# Patient Record
Sex: MALE | Race: WHITE | NOT HISPANIC OR LATINO | Employment: FULL TIME | URBAN - METROPOLITAN AREA
[De-identification: names, ages, dates, MRNs, and addresses within clinical notes are randomized per-mention and may not be internally consistent; named-entity substitution may affect disease eponyms.]

---

## 2022-09-27 ENCOUNTER — TELEPHONE (OUTPATIENT)
Dept: CARDIOLOGY | Facility: CLINIC | Age: 52
End: 2022-09-27
Payer: COMMERCIAL

## 2022-09-27 NOTE — TELEPHONE ENCOUNTER
----- Message from Marcell Starr sent at 9/27/2022 12:06 PM CDT -----  Regarding: pt called  Name of Who is Calling: REX CABRERA [[id number]]      What is the request in detail: pt called requesting appt for dates 10/4,10/5/10/12 and 10/24 please advise      Can the clinic reply by [facility]: No      What Number to Call Back if not in [facility]:[phone] Deann (wife)

## 2024-08-07 ENCOUNTER — OFFICE VISIT (OUTPATIENT)
Dept: FAMILY MEDICINE | Facility: CLINIC | Age: 54
End: 2024-08-07
Payer: COMMERCIAL

## 2024-08-07 ENCOUNTER — LAB VISIT (OUTPATIENT)
Dept: LAB | Facility: HOSPITAL | Age: 54
End: 2024-08-07
Attending: FAMILY MEDICINE
Payer: COMMERCIAL

## 2024-08-07 VITALS
DIASTOLIC BLOOD PRESSURE: 82 MMHG | WEIGHT: 218.81 LBS | SYSTOLIC BLOOD PRESSURE: 138 MMHG | BODY MASS INDEX: 33.16 KG/M2 | OXYGEN SATURATION: 98 % | HEIGHT: 68 IN | HEART RATE: 91 BPM

## 2024-08-07 DIAGNOSIS — Z23 NEED FOR VACCINATION: ICD-10-CM

## 2024-08-07 DIAGNOSIS — I10 HYPERTENSION, ESSENTIAL: ICD-10-CM

## 2024-08-07 DIAGNOSIS — R07.89 OTHER CHEST PAIN: ICD-10-CM

## 2024-08-07 DIAGNOSIS — Z00.00 ENCOUNTER FOR MEDICAL EXAMINATION TO ESTABLISH CARE: ICD-10-CM

## 2024-08-07 DIAGNOSIS — Z79.899 ENCOUNTER FOR LONG-TERM (CURRENT) USE OF OTHER MEDICATIONS: Primary | ICD-10-CM

## 2024-08-07 DIAGNOSIS — Z11.59 NEED FOR HEPATITIS C SCREENING TEST: ICD-10-CM

## 2024-08-07 DIAGNOSIS — Z12.5 SCREENING PSA (PROSTATE SPECIFIC ANTIGEN): ICD-10-CM

## 2024-08-07 DIAGNOSIS — Z79.899 ENCOUNTER FOR LONG-TERM (CURRENT) USE OF MEDICATIONS: ICD-10-CM

## 2024-08-07 DIAGNOSIS — E78.5 HYPERLIPIDEMIA LDL GOAL <130: ICD-10-CM

## 2024-08-07 DIAGNOSIS — Z11.4 SCREENING FOR HIV (HUMAN IMMUNODEFICIENCY VIRUS): ICD-10-CM

## 2024-08-07 DIAGNOSIS — Z79.899 ENCOUNTER FOR LONG-TERM (CURRENT) USE OF OTHER MEDICATIONS: ICD-10-CM

## 2024-08-07 DIAGNOSIS — Z13.220 LIPID SCREENING: ICD-10-CM

## 2024-08-07 LAB
ALBUMIN SERPL BCP-MCNC: 4.5 G/DL (ref 3.5–5.2)
ALP SERPL-CCNC: 55 U/L (ref 55–135)
ALT SERPL W/O P-5'-P-CCNC: 31 U/L (ref 10–44)
ANION GAP SERPL CALC-SCNC: 12 MMOL/L (ref 8–16)
AST SERPL-CCNC: 26 U/L (ref 10–40)
BILIRUB SERPL-MCNC: 2.4 MG/DL (ref 0.1–1)
BUN SERPL-MCNC: 10 MG/DL (ref 6–20)
CALCIUM SERPL-MCNC: 10 MG/DL (ref 8.7–10.5)
CHLORIDE SERPL-SCNC: 104 MMOL/L (ref 95–110)
CHOLEST SERPL-MCNC: 239 MG/DL (ref 120–199)
CHOLEST/HDLC SERPL: 6.3 {RATIO} (ref 2–5)
CO2 SERPL-SCNC: 20 MMOL/L (ref 23–29)
COMPLEXED PSA SERPL-MCNC: 0.96 NG/ML (ref 0–4)
CREAT SERPL-MCNC: 1 MG/DL (ref 0.5–1.4)
ERYTHROCYTE [DISTWIDTH] IN BLOOD BY AUTOMATED COUNT: 12.8 % (ref 11.5–14.5)
EST. GFR  (NO RACE VARIABLE): >60 ML/MIN/1.73 M^2
ESTIMATED AVG GLUCOSE: 97 MG/DL (ref 68–131)
GLUCOSE SERPL-MCNC: 87 MG/DL (ref 70–110)
HBA1C MFR BLD: 5 % (ref 4–5.6)
HCT VFR BLD AUTO: 46.9 % (ref 40–54)
HCV AB SERPL QL IA: NORMAL
HDLC SERPL-MCNC: 38 MG/DL (ref 40–75)
HDLC SERPL: 15.9 % (ref 20–50)
HGB BLD-MCNC: 16.4 G/DL (ref 14–18)
HIV 1+2 AB+HIV1 P24 AG SERPL QL IA: NORMAL
LDLC SERPL CALC-MCNC: 154.2 MG/DL (ref 63–159)
MCH RBC QN AUTO: 32.9 PG (ref 27–31)
MCHC RBC AUTO-ENTMCNC: 35 G/DL (ref 32–36)
MCV RBC AUTO: 94 FL (ref 82–98)
NONHDLC SERPL-MCNC: 201 MG/DL
PLATELET # BLD AUTO: 219 K/UL (ref 150–450)
PMV BLD AUTO: 10 FL (ref 9.2–12.9)
POTASSIUM SERPL-SCNC: 3.7 MMOL/L (ref 3.5–5.1)
PROT SERPL-MCNC: 7.7 G/DL (ref 6–8.4)
RBC # BLD AUTO: 4.98 M/UL (ref 4.6–6.2)
SODIUM SERPL-SCNC: 136 MMOL/L (ref 136–145)
TRIGL SERPL-MCNC: 234 MG/DL (ref 30–150)
TSH SERPL DL<=0.005 MIU/L-ACNC: 2.76 UIU/ML (ref 0.4–4)
WBC # BLD AUTO: 8.08 K/UL (ref 3.9–12.7)

## 2024-08-07 PROCEDURE — 3079F DIAST BP 80-89 MM HG: CPT | Mod: CPTII,S$GLB,, | Performed by: FAMILY MEDICINE

## 2024-08-07 PROCEDURE — 99386 PREV VISIT NEW AGE 40-64: CPT | Mod: 25,S$GLB,, | Performed by: FAMILY MEDICINE

## 2024-08-07 PROCEDURE — 4010F ACE/ARB THERAPY RXD/TAKEN: CPT | Mod: CPTII,S$GLB,, | Performed by: FAMILY MEDICINE

## 2024-08-07 PROCEDURE — 90471 IMMUNIZATION ADMIN: CPT | Mod: S$GLB,,, | Performed by: FAMILY MEDICINE

## 2024-08-07 PROCEDURE — 83036 HEMOGLOBIN GLYCOSYLATED A1C: CPT | Performed by: FAMILY MEDICINE

## 2024-08-07 PROCEDURE — 3075F SYST BP GE 130 - 139MM HG: CPT | Mod: CPTII,S$GLB,, | Performed by: FAMILY MEDICINE

## 2024-08-07 PROCEDURE — 86803 HEPATITIS C AB TEST: CPT | Performed by: FAMILY MEDICINE

## 2024-08-07 PROCEDURE — 85027 COMPLETE CBC AUTOMATED: CPT | Performed by: FAMILY MEDICINE

## 2024-08-07 PROCEDURE — 99999 PR PBB SHADOW E&M-EST. PATIENT-LVL V: CPT | Mod: PBBFAC,,, | Performed by: FAMILY MEDICINE

## 2024-08-07 PROCEDURE — 1159F MED LIST DOCD IN RCRD: CPT | Mod: CPTII,S$GLB,, | Performed by: FAMILY MEDICINE

## 2024-08-07 PROCEDURE — 90715 TDAP VACCINE 7 YRS/> IM: CPT | Mod: S$GLB,,, | Performed by: FAMILY MEDICINE

## 2024-08-07 PROCEDURE — 87389 HIV-1 AG W/HIV-1&-2 AB AG IA: CPT | Performed by: FAMILY MEDICINE

## 2024-08-07 PROCEDURE — 36415 COLL VENOUS BLD VENIPUNCTURE: CPT | Mod: PO | Performed by: FAMILY MEDICINE

## 2024-08-07 PROCEDURE — 80061 LIPID PANEL: CPT | Performed by: FAMILY MEDICINE

## 2024-08-07 PROCEDURE — 84153 ASSAY OF PSA TOTAL: CPT | Performed by: FAMILY MEDICINE

## 2024-08-07 PROCEDURE — 84443 ASSAY THYROID STIM HORMONE: CPT | Performed by: FAMILY MEDICINE

## 2024-08-07 PROCEDURE — 3008F BODY MASS INDEX DOCD: CPT | Mod: CPTII,S$GLB,, | Performed by: FAMILY MEDICINE

## 2024-08-07 PROCEDURE — 80053 COMPREHEN METABOLIC PANEL: CPT | Performed by: FAMILY MEDICINE

## 2024-08-07 RX ORDER — AMLODIPINE BESYLATE 10 MG/1
10 TABLET ORAL DAILY
Qty: 30 EACH | Refills: 11 | Status: SHIPPED | OUTPATIENT
Start: 2024-08-07 | End: 2025-08-07

## 2024-08-07 RX ORDER — LISINOPRIL 10 MG/1
10 TABLET ORAL DAILY
COMMUNITY
Start: 2005-08-06 | End: 2024-08-07

## 2024-08-07 RX ORDER — LISINOPRIL 10 MG/1
10 TABLET ORAL DAILY
Qty: 90 TABLET | Refills: 4 | Status: SHIPPED | OUTPATIENT
Start: 2024-08-07

## 2024-08-07 RX ORDER — TADALAFIL 20 MG/1
TABLET ORAL
COMMUNITY
Start: 2024-02-24

## 2024-08-08 NOTE — PROGRESS NOTES
Make follow-up lab appointment per recommendation below.  Check to see if patient has seen the results through my chart.  If not then,  #CALL THE PATIENT# to discuss results/see if they have questions and document verification of contact. Make F/U appt if needed. [phone]    #My interpretation that was sent to them through [facility]:  Hal, I have reviewed your recent blood work.     HIV screening is nonreactive.  Hepatitis-C screening is nonreactive.  PSA screening for prostate cancer is within normal limits.  Repeat annually.  Your complete blood count is normal.    Your metabolic panel which shows your glucose, kidney function, electrolytes, and liver function is mostly within normal limits.  Total bilirubin is elevated however this is stable from previous blood work.  Likely due to fasting state and or Gilbert's syndrome.  Other liver enzymes are normal.  Follow-up sooner if having any jaundice, nausea vomiting abdominal pain etcetera.  Thyroid study is normal.   Your cholesterol is borderline high.  Risk score is slightly elevated.  I recommend lifestyle modification with high-fiber diet and increase in exercise.  Recheck level in six months.  Your hemoglobin A1c is normal.  This test is gold standard screening test for diabetes.  It is a measures 3 months of your average blood sugar.  =========================  Also please address any outstanding health maintenance that may be due: Colorectal Cancer Screening Never done  Shingles Vaccine(1 of 2) Never done

## 2024-09-11 ENCOUNTER — PATIENT MESSAGE (OUTPATIENT)
Dept: FAMILY MEDICINE | Facility: CLINIC | Age: 54
End: 2024-09-11
Payer: COMMERCIAL

## 2024-09-23 ENCOUNTER — OFFICE VISIT (OUTPATIENT)
Dept: CARDIOLOGY | Facility: CLINIC | Age: 54
End: 2024-09-23
Payer: COMMERCIAL

## 2024-09-23 VITALS
HEIGHT: 68 IN | BODY MASS INDEX: 34.11 KG/M2 | DIASTOLIC BLOOD PRESSURE: 79 MMHG | HEART RATE: 88 BPM | WEIGHT: 225.06 LBS | SYSTOLIC BLOOD PRESSURE: 125 MMHG

## 2024-09-23 DIAGNOSIS — R07.89 OTHER CHEST PAIN: ICD-10-CM

## 2024-09-23 DIAGNOSIS — I10 HYPERTENSION, ESSENTIAL: ICD-10-CM

## 2024-09-23 DIAGNOSIS — E78.5 HYPERLIPIDEMIA LDL GOAL <130: ICD-10-CM

## 2024-09-23 DIAGNOSIS — Z13.6 ENCOUNTER FOR SCREENING FOR CARDIOVASCULAR DISORDERS: Primary | ICD-10-CM

## 2024-09-23 PROCEDURE — 99999 PR PBB SHADOW E&M-EST. PATIENT-LVL III: CPT | Mod: PBBFAC,,, | Performed by: INTERNAL MEDICINE

## 2024-09-23 PROCEDURE — 1160F RVW MEDS BY RX/DR IN RCRD: CPT | Mod: CPTII,S$GLB,, | Performed by: INTERNAL MEDICINE

## 2024-09-23 PROCEDURE — 3078F DIAST BP <80 MM HG: CPT | Mod: CPTII,S$GLB,, | Performed by: INTERNAL MEDICINE

## 2024-09-23 PROCEDURE — 1159F MED LIST DOCD IN RCRD: CPT | Mod: CPTII,S$GLB,, | Performed by: INTERNAL MEDICINE

## 2024-09-23 PROCEDURE — 3008F BODY MASS INDEX DOCD: CPT | Mod: CPTII,S$GLB,, | Performed by: INTERNAL MEDICINE

## 2024-09-23 PROCEDURE — 3074F SYST BP LT 130 MM HG: CPT | Mod: CPTII,S$GLB,, | Performed by: INTERNAL MEDICINE

## 2024-09-23 PROCEDURE — 93010 ELECTROCARDIOGRAM REPORT: CPT | Mod: S$GLB,,, | Performed by: INTERNAL MEDICINE

## 2024-09-23 PROCEDURE — 4010F ACE/ARB THERAPY RXD/TAKEN: CPT | Mod: CPTII,S$GLB,, | Performed by: INTERNAL MEDICINE

## 2024-09-23 PROCEDURE — 99204 OFFICE O/P NEW MOD 45 MIN: CPT | Mod: S$GLB,,, | Performed by: INTERNAL MEDICINE

## 2024-09-23 PROCEDURE — 3044F HG A1C LEVEL LT 7.0%: CPT | Mod: CPTII,S$GLB,, | Performed by: INTERNAL MEDICINE

## 2024-09-23 PROCEDURE — 93005 ELECTROCARDIOGRAM TRACING: CPT | Mod: PO

## 2024-09-23 NOTE — PROGRESS NOTES
Subjective:    Patient ID:  Hal Duncan is a 53 y.o. male who presents for evaluation of Chest Pain and Shortness of Breath      HPI  He comes with worsening chest discomfort some shortness of breath with and without exertion, this has gotten worse over the last year.    Risk factors include hypertension, dyslipidemia and family history for CAD.    No history of tobacco use.  No history of diabetes.    No previous cardiac workup.  He has a CRNA at [place]    Review of Systems   Constitutional: Negative for decreased appetite, malaise/fatigue, weight gain and weight loss.   Cardiovascular:  Negative for chest pain, dyspnea on exertion, leg swelling, palpitations and syncope.   Respiratory:  Negative for cough and shortness of breath.    Gastrointestinal: Negative.    Neurological:  Negative for weakness.   All other systems reviewed and are negative.     Objective:      Physical Exam  Vitals and nursing note reviewed.   Constitutional:       Appearance: Normal appearance. He is well-developed.   HENT:      Head: Normocephalic.   Eyes:      Pupils: Pupils are equal, round, and reactive to light.   Neck:      Thyroid: No thyromegaly.      Vascular: No carotid bruit or JVD.   Cardiovascular:      Rate and Rhythm: Normal rate and regular rhythm.      Chest Wall: PMI is not displaced.      Pulses: Normal pulses and intact distal pulses.      Heart sounds: Normal heart sounds. No murmur heard.     No gallop.   Pulmonary:      Effort: Pulmonary effort is normal.      Breath sounds: Normal breath sounds.   Abdominal:      Palpations: Abdomen is soft. There is no mass.      Tenderness: There is no abdominal tenderness.   Musculoskeletal:         General: Normal range of motion.      Cervical back: Normal range of motion and neck supple.   Skin:     General: Skin is warm.   Neurological:      Mental Status: He is alert and oriented to person, place, and time.      Sensory: No sensory deficit.      Deep Tendon Reflexes:  Reflexes are normal and symmetric.         Most Recent EKG Results  No results found for this or any previous visit.    Most Recent Echocardiogram Results  No results found for this or any previous visit.      Most Recent Nuclear Stress Test Results  No results found for this or any previous visit.      Most Recent Cardiac PET Stress Test Results  No results found for this or any previous visit.      Most Recent Cardiovascular Angiogram results  No results found for this or any previous visit.      Other Most Recent Cardiology Results  No results found for this or any previous visit.      Labs reviewed    Assessment:       1. Encounter for screening for cardiovascular disorders    2. Other chest pain    3. Hyperlipidemia LDL goal <130    4. Hypertension, essential         Plan:   Will get echocardiogram, nuclear stress test, calcium score.    Call with results  Continue:  Ace inhibitor and Calcium blocker  Regular exercise program  Weight loss  Low cholesterol diet

## 2024-09-24 ENCOUNTER — HOSPITAL ENCOUNTER (OUTPATIENT)
Dept: CARDIOLOGY | Facility: HOSPITAL | Age: 54
Discharge: HOME | End: 2024-09-24
Attending: INTERNAL MEDICINE
Payer: COMMERCIAL

## 2024-09-24 VITALS — HEIGHT: 68 IN | WEIGHT: 225 LBS | BODY MASS INDEX: 34.1 KG/M2

## 2024-09-24 DIAGNOSIS — R07.89 OTHER CHEST PAIN: ICD-10-CM

## 2024-09-24 DIAGNOSIS — Z13.6 ENCOUNTER FOR SCREENING FOR CARDIOVASCULAR DISORDERS: ICD-10-CM

## 2024-09-24 LAB
OHS QRS DURATION: 90 MS
OHS QTC CALCULATION: 413 MS

## 2024-09-24 PROCEDURE — 93306 TTE W/DOPPLER COMPLETE: CPT | Mod: 26,,, | Performed by: INTERNAL MEDICINE

## 2024-09-24 PROCEDURE — 93306 TTE W/DOPPLER COMPLETE: CPT | Mod: PO

## 2024-09-25 ENCOUNTER — TELEPHONE (OUTPATIENT)
Dept: CARDIOLOGY | Facility: CLINIC | Age: 54
End: 2024-09-25
Payer: COMMERCIAL

## 2024-09-25 LAB
ASCENDING AORTA: 3.05 CM
AV INDEX (PROSTH): 0.85
AV MEAN GRADIENT: 5 MMHG
AV PEAK GRADIENT: 9 MMHG
AV VALVE AREA BY VELOCITY RATIO: 3.64 CM²
AV VALVE AREA: 3.41 CM²
AV VELOCITY RATIO: 0.91
BSA FOR ECHO PROCEDURE: 2.21 M2
CV ECHO LV RWT: 0.34 CM
DOP CALC AO PEAK VEL: 1.51 M/S
DOP CALC AO VTI: 26.1 CM
DOP CALC LVOT AREA: 4 CM2
DOP CALC LVOT DIAMETER: 2.26 CM
DOP CALC LVOT PEAK VEL: 1.37 M/S
DOP CALC LVOT STROKE VOLUME: 89.01 CM3
DOP CALCLVOT PEAK VEL VTI: 22.2 CM
E WAVE DECELERATION TIME: 205.37 MSEC
E/A RATIO: 1.32
E/E' RATIO: 8.6 M/S
ECHO LV POSTERIOR WALL: 0.88 CM (ref 0.6–1.1)
FRACTIONAL SHORTENING: 43 % (ref 28–44)
INTERVENTRICULAR SEPTUM: 0.96 CM (ref 0.6–1.1)
IVRT: 58.99 MSEC
LEFT ATRIUM AREA SYSTOLIC (APICAL 2 CHAMBER): 18.87 CM2
LEFT ATRIUM AREA SYSTOLIC (APICAL 4 CHAMBER): 20.76 CM2
LEFT ATRIUM SIZE: 4.22 CM
LEFT ATRIUM VOLUME INDEX MOD: 28.8 ML/M2
LEFT ATRIUM VOLUME MOD: 62.01 ML
LEFT INTERNAL DIMENSION IN SYSTOLE: 2.99 CM (ref 2.1–4)
LEFT VENTRICLE DIASTOLIC VOLUME INDEX: 61.63 ML/M2
LEFT VENTRICLE DIASTOLIC VOLUME: 132.51 ML
LEFT VENTRICLE END SYSTOLIC VOLUME APICAL 2 CHAMBER: 54.4 ML
LEFT VENTRICLE END SYSTOLIC VOLUME APICAL 4 CHAMBER: 69.23 ML
LEFT VENTRICLE MASS INDEX: 82 G/M2
LEFT VENTRICLE SYSTOLIC VOLUME INDEX: 16.1 ML/M2
LEFT VENTRICLE SYSTOLIC VOLUME: 34.6 ML
LEFT VENTRICULAR INTERNAL DIMENSION IN DIASTOLE: 5.25 CM (ref 3.5–6)
LEFT VENTRICULAR MASS: 176.73 G
LV LATERAL E/E' RATIO: 7.82 M/S
LV SEPTAL E/E' RATIO: 9.56 M/S
LVED V (TEICH): 132.51 ML
LVES V (TEICH): 34.6 ML
LVOT MG: 3.36 MMHG
LVOT MV: 0.84 CM/S
MV PEAK A VEL: 0.65 M/S
MV PEAK E VEL: 0.86 M/S
MV STENOSIS PRESSURE HALF TIME: 59.56 MS
MV VALVE AREA P 1/2 METHOD: 3.69 CM2
OHS CV RV/LV RATIO: 0.75 CM
PISA TR MAX VEL: 1.54 M/S
PULM VEIN S/D RATIO: 1.39
PV PEAK D VEL: 0.44 M/S
PV PEAK S VEL: 0.61 M/S
RA PRESSURE ESTIMATED: 3 MMHG
RIGHT VENTRICLE DIASTOLIC LENGTH: 5.8 CM
RIGHT VENTRICLE DIASTOLIC MID DIMENSION: 2.3 CM
RIGHT VENTRICULAR END-DIASTOLIC DIMENSION: 3.94 CM
RIGHT VENTRICULAR LENGTH IN DIASTOLE (APICAL 4-CHAMBER VIEW): 5.84 CM
RV MID DIAMA: 2.27 CM
RV TB RVSP: 5 MMHG
RV TISSUE DOPPLER FREE WALL SYSTOLIC VELOCITY 1 (APICAL 4 CHAMBER VIEW): 15.73 CM/S
SINUS: 3.79 CM
STJ: 3 CM
TDI LATERAL: 0.11 M/S
TDI SEPTAL: 0.09 M/S
TDI: 0.1 M/S
TR MAX PG: 9 MMHG
TRICUSPID ANNULAR PLANE SYSTOLIC EXCURSION: 2.16 CM
TV REST PULMONARY ARTERY PRESSURE: 12 MMHG
Z-SCORE OF LEFT VENTRICULAR DIMENSION IN END DIASTOLE: -2.85
Z-SCORE OF LEFT VENTRICULAR DIMENSION IN END SYSTOLE: -2.78

## 2024-09-25 NOTE — TELEPHONE ENCOUNTER
----- Message from Kurt Burkett MD sent at 9/25/2024  2:02 PM CDT -----  Echocardiogram within normal limits

## 2024-10-04 ENCOUNTER — HOSPITAL ENCOUNTER (OUTPATIENT)
Dept: RADIOLOGY | Facility: HOSPITAL | Age: 54
Discharge: HOME | End: 2024-10-04
Attending: INTERNAL MEDICINE

## 2024-10-04 DIAGNOSIS — Z13.6 ENCOUNTER FOR SCREENING FOR CARDIOVASCULAR DISORDERS: ICD-10-CM

## 2024-10-04 DIAGNOSIS — R07.89 OTHER CHEST PAIN: ICD-10-CM

## 2024-10-04 PROCEDURE — 75571 CT HRT W/O DYE W/CA TEST: CPT | Mod: TC,SP,PO

## 2024-10-04 PROCEDURE — 75571 CT HRT W/O DYE W/CA TEST: CPT | Mod: 26,SP,, | Performed by: RADIOLOGY

## 2024-10-05 NOTE — PROGRESS NOTES
Coronary calcium score is quite high, almost 2000.  I think coronary angiogram is appropriate at this time, even if nuclear stress test not done yet. If he wants to go ahead and schedule cardiac cath, fine with me. If he wants to wait foe nuclear stress test id ok with me as well.

## 2024-10-07 ENCOUNTER — TELEPHONE (OUTPATIENT)
Dept: CARDIOLOGY | Facility: CLINIC | Age: 54
End: 2024-10-07
Payer: COMMERCIAL

## 2024-10-07 ENCOUNTER — TELEPHONE (OUTPATIENT)
Dept: FAMILY MEDICINE | Facility: CLINIC | Age: 54
End: 2024-10-07
Payer: COMMERCIAL

## 2024-10-07 DIAGNOSIS — R93.1 ELEVATED CORONARY ARTERY CALCIUM SCORE: Primary | ICD-10-CM

## 2024-10-07 DIAGNOSIS — R07.9 CHEST PAIN, UNSPECIFIED TYPE: ICD-10-CM

## 2024-10-07 RX ORDER — SODIUM CHLORIDE 9 MG/ML
INJECTION, SOLUTION INTRAVENOUS ONCE
OUTPATIENT
Start: 2024-10-07 | End: 2024-10-07

## 2024-10-07 RX ORDER — SODIUM CHLORIDE 0.9 % (FLUSH) 0.9 %
10 SYRINGE (ML) INJECTION
Status: SHIPPED | OUTPATIENT
Start: 2024-10-07

## 2024-10-07 NOTE — TELEPHONE ENCOUNTER
Spoke with patient he would like to proceed with Angiogram. Will schedule. Patient verbalized understanding.

## 2024-10-07 NOTE — TELEPHONE ENCOUNTER
----- Message from Mati sent at 10/7/2024  7:36 AM CDT -----  Type:  Needs Medical Advice    Who Called: Pt    Would the patient rather a call back or a response via [facility]? Call back    Best Call Back Number: [phone]      Additional Information: Sts that he was told by Dr Klein that he needed a cardio cath done and is supposed to be in the notes as soon as possible.   Please advise -- Thank you

## 2024-10-07 NOTE — TELEPHONE ENCOUNTER
----- Message from Tech Lacy sent at 10/7/2024 11:15 AM CDT -----  Regarding: Incidental Finding  Good morning. The patient had a CT Cardiac Scoring ordered by Cardiology and the radiologist is recommending a CT Chest without contrast within 6-12 months due to the incidental finding of a 6 mm solid nodule in the right lower lobe. Could you please follow up with the patient? Thank you.

## 2024-10-08 ENCOUNTER — TELEPHONE (OUTPATIENT)
Dept: CARDIOLOGY | Facility: CLINIC | Age: 54
End: 2024-10-08
Payer: COMMERCIAL

## 2024-10-08 DIAGNOSIS — Z91.041 ALLERGY HISTORY, RADIOGRAPHIC DYE: Primary | ICD-10-CM

## 2024-10-08 RX ORDER — PREDNISONE 50 MG/1
50 TABLET ORAL SEE ADMIN INSTRUCTIONS
Qty: 3 TABLET | Refills: 0 | Status: SHIPPED | OUTPATIENT
Start: 2024-10-08

## 2024-10-08 RX ORDER — DIPHENHYDRAMINE HCL 50 MG
50 CAPSULE ORAL SEE ADMIN INSTRUCTIONS
Qty: 1 CAPSULE | Refills: 0 | Status: SHIPPED | OUTPATIENT
Start: 2024-10-08

## 2024-10-08 NOTE — TELEPHONE ENCOUNTER
Spoke with patient and scheduled Angiogram on 10/14/24 and to arrive at [facility] at 11 am. NPO after midnight. Patient verbalized understanding.

## 2024-10-09 ENCOUNTER — OFFICE VISIT (OUTPATIENT)
Dept: FAMILY MEDICINE | Facility: CLINIC | Age: 54
End: 2024-10-09
Payer: COMMERCIAL

## 2024-10-09 VITALS
WEIGHT: 215.81 LBS | HEART RATE: 120 BPM | DIASTOLIC BLOOD PRESSURE: 84 MMHG | OXYGEN SATURATION: 96 % | SYSTOLIC BLOOD PRESSURE: 128 MMHG | HEIGHT: 68 IN | BODY MASS INDEX: 32.71 KG/M2

## 2024-10-09 DIAGNOSIS — Z79.899 ENCOUNTER FOR LONG-TERM (CURRENT) USE OF MEDICATIONS: ICD-10-CM

## 2024-10-09 DIAGNOSIS — E78.5 HYPERLIPIDEMIA LDL GOAL <70: ICD-10-CM

## 2024-10-09 DIAGNOSIS — R91.1 SOLITARY PULMONARY NODULE: Primary | ICD-10-CM

## 2024-10-09 DIAGNOSIS — J21.9 BRONCHIOLITIS: ICD-10-CM

## 2024-10-09 DIAGNOSIS — R93.1 HIGH CORONARY ARTERY CALCIUM SCORE: ICD-10-CM

## 2024-10-09 DIAGNOSIS — Z23 NEED FOR VACCINATION: ICD-10-CM

## 2024-10-09 PROCEDURE — 99214 OFFICE O/P EST MOD 30 MIN: CPT | Mod: 25,S$GLB,, | Performed by: FAMILY MEDICINE

## 2024-10-09 PROCEDURE — 4010F ACE/ARB THERAPY RXD/TAKEN: CPT | Mod: CPTII,S$GLB,, | Performed by: FAMILY MEDICINE

## 2024-10-09 PROCEDURE — 3074F SYST BP LT 130 MM HG: CPT | Mod: CPTII,S$GLB,, | Performed by: FAMILY MEDICINE

## 2024-10-09 PROCEDURE — 3008F BODY MASS INDEX DOCD: CPT | Mod: CPTII,S$GLB,, | Performed by: FAMILY MEDICINE

## 2024-10-09 PROCEDURE — 3079F DIAST BP 80-89 MM HG: CPT | Mod: CPTII,S$GLB,, | Performed by: FAMILY MEDICINE

## 2024-10-09 PROCEDURE — 99999 PR PBB SHADOW E&M-EST. PATIENT-LVL V: CPT | Mod: PBBFAC,,, | Performed by: FAMILY MEDICINE

## 2024-10-09 PROCEDURE — 1159F MED LIST DOCD IN RCRD: CPT | Mod: CPTII,S$GLB,, | Performed by: FAMILY MEDICINE

## 2024-10-09 PROCEDURE — 90656 IIV3 VACC NO PRSV 0.5 ML IM: CPT | Mod: S$GLB,,, | Performed by: FAMILY MEDICINE

## 2024-10-09 PROCEDURE — 3044F HG A1C LEVEL LT 7.0%: CPT | Mod: CPTII,S$GLB,, | Performed by: FAMILY MEDICINE

## 2024-10-09 PROCEDURE — 90471 IMMUNIZATION ADMIN: CPT | Mod: S$GLB,,, | Performed by: FAMILY MEDICINE

## 2024-10-09 RX ORDER — ATORVASTATIN CALCIUM 40 MG/1
40 TABLET, FILM COATED ORAL DAILY
Qty: 90 TABLET | Refills: 3 | Status: SHIPPED | OUTPATIENT
Start: 2024-10-09 | End: 2025-10-09

## 2024-10-09 NOTE — PATIENT INSTRUCTIONS
Follow up in about 6 months (around 4/9/2025), or if symptoms worsen or fail to improve.     Dear patient,   As a result of recent federal legislation (The Federal Cures Act), you may receive lab or pathology results from your visit in your [facility] account before your physician is able to contact you. Your physician or their representative will relay the results to you with their recommendations at their soonest availability.     If no improvement in symptoms or symptoms worsen, please be advised to call MD, follow-up at clinic and/or go to ER if becomes severe.    Theron Wick M.D.        We Offer TELEHEALTH & Same Day Appointments!   Book your Telehealth appointment with me through my nurse or   Clinic appointments on [facility]!    [address] [place] [address]    Office: [phone]   FAX: [phone]    Check out my Facebook Page [email or web address] Follow Me at: [email or web address]    Check out my website at [facility] by clicking on: [email or web address]    [email or web address] Schedule appointments online, go to [email or web address]: [email or web address]

## 2024-10-10 NOTE — PROGRESS NOTES
PLAN:      Assessment & Plan  1. Coronary Artery Disease.-- hyperlipidemia  The CT scan shows significant plaque buildup in the coronary arteries, with prominent calcification in the left anterior descending and right coronary arteries. He is scheduled for an angiogram on Monday. A high-dose statin regimen will be initiated with Lipitor 40 mg to be taken at night. Over-the-counter CoQ10 enzymes are recommended to mitigate potential side effects of the statin. His LDL target is set to less than 70. Fish oil supplements are recommended for their anti-inflammatory properties, and red yeast rice is suggested as an additional supplement. A lipid panel will be conducted in approximately 6 months. If he experiences any adverse effects from the statin, the dosage will be adjusted accordingly. He should keep updated on his condition.  Counseled on hyperlipidemia disease course, healthy diet and increased need for exercise.  Please be advised of the risk of cardiovascular disease, increase stroke and heart attack risk with uncontrolled/untreated hyperlipidemia.     Patient voiced understanding and understood the treatment plan. All questions were answered.     2. Bronchiolitis.  The CT scan indicates bronchial wall thickening and small airway inflammation, correlating with his persistent cough. He has been coughing for about 2 weeks following a viral illness. If the cough does not improve within one to two weeks, antibiotic treatment will be considered. If his condition worsens, or if he develops chest pain or fever, he should inform the provider.    3. Pulmonary Nodule.  A 6 mm solid nodule in the right lower lobe was noted on the CT scan. A follow-up non-contrast CT scan of the chest is recommended in 6 months and again at 18 to 24 months to monitor the nodule.    4. Aortic Valve Calcification.  Incidental findings of aortic valve calcifications were noted on the CT scan. This will be managed by his cardiologist,   Ernie.    5. Health Maintenance.  He will receive his influenza vaccine today. Once his cardiac issues are resolved and he feels better, he should receive the shingles vaccine, with the second dose to be administered 2 to 6 months after the first.    CHRONIC. Stable. Compliant with medications for managed conditions. See medication list. No SE reported.   Routine lab analysis is being monitored. Refills were addressed.  Lab Results   Component Value Date    WBC 8.08 08/07/2024    HGB 16.4 08/07/2024    HCT 46.9 08/07/2024    MCV 94 08/07/2024     08/07/2024         Chemistry        Component Value Date/Time     08/07/2024 1525    K 3.7 08/07/2024 1525     08/07/2024 1525    CO2 20 (L) 08/07/2024 1525    BUN 10 08/07/2024 1525    CREATININE 1.0 08/07/2024 1525    GLU 87 08/07/2024 1525        Component Value Date/Time    CALCIUM 10.0 08/07/2024 1525    ALKPHOS 55 08/07/2024 1525    AST 26 08/07/2024 1525    ALT 31 08/07/2024 1525    BILITOT 2.4 (H) 08/07/2024 1525          Lab Results   Component Value Date    TSH 2.757 08/07/2024     Complete history and physical was completed today.  Complete and thorough medication reconciliation was performed.  Discussed risks and benefits of medications.  Advised patient on orders and health maintenance.  We discussed old records and old labs if available.  Will request any records not available through epic.  Continue current medications listed on your summary sheet.        Follow-up  Return in 6 months for follow-up.    Problem List Items Addressed This Visit       Hyperlipidemia LDL goal <70 (Chronic)    Relevant Medications    atorvastatin (LIPITOR) 40 MG tablet    Other Relevant Orders    Lipid Panel    CBC Without Differential    Comprehensive Metabolic Panel    TSH    Hemoglobin A1C    Solitary pulmonary nodule - Primary (Chronic)    Relevant Orders    CT Chest Without Contrast    High coronary artery calcium score (Chronic)    Relevant Orders    CBC  Without Differential    Comprehensive Metabolic Panel    TSH    Hemoglobin A1C    Encounter for long-term (current) use of medications    Relevant Orders    CBC Without Differential    Comprehensive Metabolic Panel    TSH    Hemoglobin A1C    Bronchiolitis    Relevant Orders    CBC Without Differential    Comprehensive Metabolic Panel    TSH    Hemoglobin A1C     Other Visit Diagnoses       Need for vaccination        Relevant Medications    influenza (Flulaval, Fluzone, Fluarix) 45 mcg/0.5 mL IM vaccine (> or = 6 mo) 0.5 mL (Completed)        Discussed risk and benefits of influenza vaccination.  Patient agrees to proceed.  Future Appointments       Date Specialty Appt Notes    4/9/2025 Radiology .           Medication Management for assessment above:   Medication List with Changes/Refills   New Medications    ATORVASTATIN (LIPITOR) 40 MG TABLET    Take 1 tablet (40 mg total) by mouth once daily.   Current Medications    AMLODIPINE (NORVASC) 10 MG TABLET    Take 1 tablet (10 mg total) by mouth once daily.    DIPHENHYDRAMINE (BENADRYL) 50 MG CAPSULE    Take 1 capsule (50 mg total) by mouth As instructed for Allergies (TAKE 1 HOUR PRIOR TO PROCEDURE). TAKE ONE TABLET OF BENADRYL 1 HOUR PROCEDURE.    LISINOPRIL 10 MG TABLET    Take 1 tablet (10 mg total) by mouth once daily.    PREDNISONE (DELTASONE) 50 MG TAB    Take 1 tablet (50 mg total) by mouth As instructed (Take one tablet 13 hours prior to procedure, 7 hours prior to procedure and 1 hour prior to procedure.). TAKE ONE TABLET OF PREDNISONE 13 HOURS BEFORE PROCEDURE, 7 HOURS BEFORE PROCEDURE, 1 HOUR BEFORE PROCEDURE.   Discontinued Medications    TADALAFIL (CIALIS) 20 MG TAB    TAKE ONE TABLET BY MOUTH 1 HOUR BEFORE SEX (MAX OF 1 TABLET PER DAY)       Theron Wick M.D.  ==========================================================================  Subjective:   Patient ID: Hal Duncan is a 53 y.o. male.  has no past medical history on file.   Chief  Complaint: Follow-up      History of Present Illness  The patient is a 53-year-old male here to discuss CT results. Dr. Klein ordered the CT scan, and he has an angiogram scheduled for Monday.    He reports experiencing chest discomfort and occasional mild chest pain, which led to a consultation with his cardiologist, Dr. Klein. A CT scan was performed, revealing severe results, while an echocardiogram showed normal limits. He is curious about the potential benefits of statins and supplements like fish oil. He used to take baby aspirin daily but has since stopped. He has made dietary changes, reducing his intake of saturated fats and carbohydrates.    He also mentions a persistent cough that started three weeks ago, for which he has been taking medication. He had a fever two and a half weeks ago, but currently, he only has a cough.    He underwent colon cancer screening three years ago and was advised to repeat it in five years. He is interested in learning about any other beneficial supplements.    ALLERGIES  He is allergic to FISH.    Problem List Items Addressed This Visit       Hyperlipidemia LDL goal <70 (Chronic)    Solitary pulmonary nodule - Primary (Chronic)    High coronary artery calcium score (Chronic)    Overview     Narrative & Impression  EXAMINATION:  CT CALCIUM SCORING CARDIAC     CLINICAL HISTORY:  CAD screening, intermediate CAD risk, treadmill candidate;  Other chest pain     TECHNIQUE:  Gated,  low dose axial images were performed over the heart.     COMPARISON:  None.     FINDINGS:  Important information about your scan:     The following information is based on analysis of the coronary arteries only.  Calcium deposits do not correspond directly to the percentage of narrowing of the arteries.  They do correlate directly to the amount of coronary plaque, and to the risk of future coronary disease.  These calcium deposits usually begin to form years before any symptoms develop.  Early detection and  modification of risk factors, such as smoking and cholesterol intake, and slow progression of coronary artery disease.     A low score suggests a low likelihood of coronary artery disease, but does not exclude the possibility of significant coronary artery narrowing.  The results should be discussed with your physician taking into account other risk factors such as age, gender, family history, diabetes, smoking or high cholesterol levels.     Should you experience chest pain, difficulty breathing, discomfort radiating into her neck or arm, or discomfort combined with lightheadedness, sweating, fainting or nausea, you should seek prompt medical attention.     Calcium score:     0: Very low likelihood of coronary artery disease-generally less than 5% risk of coronary artery disease.     1-10: Coronary artery disease very unlikely-less than 10% risk of coronary artery disease     11 through 100: Mild or minimal coronary artery narrowings likely     101 through 400: Mild coronary artery disease highly likely, significant coronary artery narrowings possible.     Greater than 401: High likelihood of at least one significant coronary narrowing     SCORE SUMMARY     Your total calcium score is 1950 with prominent coronary artery calcifications noted in the left anterior descending artery and right coronary artery.     Incidental findings: There are aortic annulus calcifications.  There is a 19 mm short axis subcarinal mediastinal lymph node.  There are tree in bud distribution micro nodules present over large area in the right lower lobe and there is bronchial wall thickening within the small airways of the right lower lobe suggesting possible infectious/inflammatory bronchiolitis.  There is a 6 mm solid nodule in the right lower lobe on series 2, image 33.  Consider additional investigation with dedicated CT of the chest.     Impression:     1. The coronary artery calcium screening score is 1950.This implies extensive  calcified atherosclerotic plaque deposition within the coronary arteries.  There is a high likelihood of at least 1 significant coronary artery narrowing.  Coronary artery calcifications appear most pronounced within the left anterior descending artery.  2. Imaging findings which suggest infectious/inflammatory bronchiolitis within the right lower lobe.  Consider dedicated non-contrast chest CT for additional evaluation as deemed clinically appropriate.  3. 6 mm solid nodule in the right lower lobe.  For a solid nodule 6-8 mm, Fleischner Society 2017 guidelines recommend follow up with non-contrast chest CT at 6-12 months and 18-24 months after discovery.  Yellow Pulmonary Nodule 2017 Alert:     Yellow Actionable Finding (Radiology: Volume 284: Number 1-July 2017)     Fleischner Guidelines do not apply in patients younger than 35 years, immunocompromised patients or patients with cancer. Risk assignment based on ACCP with low risk being less than 5% and high risk combining intermediate and high risk categories.     UNEXPECTED FINDINGS:  Incidental Pulmonary Nodule Single Solid 6-8 mm (2)     RECOMMENDATIONS:  If Low Risk CT Chest without contrast 6-12 months then consider 18-24 months, if High Risk CT Chest without contrast at 6-12 months and 18-24 months     This report was flagged in Epic as abnormal.        Electronically signed by:Joss Wallace MD  Date:                                            10/04/2024  Time:                                           15:13        Exam Ended: 10/04/24 14:51 CDT            Encounter for long-term (current) use of medications    Bronchiolitis     Other Visit Diagnoses       Need for vaccination                 Review of patient's allergies indicates:   Allergen Reactions    Fish containing products Anaphylaxis and Itching    Iodinated contrast media Hives    Iodine      Current Outpatient Medications   Medication Instructions    amLODIPine (NORVASC) 10 mg, Oral, Daily     "atorvastatin (LIPITOR) 40 mg, Oral, Daily    diphenhydrAMINE (BENADRYL) 50 mg, Oral, See admin instructions, TAKE ONE TABLET OF BENADRYL 1 HOUR PROCEDURE.    lisinopriL 10 mg, Oral, Daily    predniSONE (DELTASONE) 50 mg, Oral, See admin instructions, TAKE ONE TABLET OF PREDNISONE 13 HOURS BEFORE PROCEDURE, 7 HOURS BEFORE PROCEDURE, 1 HOUR BEFORE PROCEDURE.      I have reviewed the PMH, social history, FamilyHx, surgical history, allergies and medications documented / confirmed by the patient at the time of this visit.  Review of Systems   Constitutional:  Negative for chills, fatigue, fever and unexpected weight change.   HENT:  Negative for ear pain and sore throat.    Eyes:  Negative for redness and visual disturbance.   Respiratory:  Negative for cough and shortness of breath.    Cardiovascular:  Positive for chest pain (Intermittent not currently). Negative for palpitations.   Gastrointestinal:  Negative for nausea and vomiting.   Endocrine: Negative for cold intolerance and heat intolerance.   Genitourinary:  Negative for difficulty urinating and hematuria.   Musculoskeletal:  Negative for arthralgias and myalgias.   Skin:  Negative for rash and wound.   Allergic/Immunologic: Negative for environmental allergies and food allergies.   Neurological:  Negative for weakness and headaches.   Hematological:  Negative for adenopathy. Does not bruise/bleed easily.   Psychiatric/Behavioral:  Negative for sleep disturbance. The patient is not nervous/anxious.      Objective:   /84   Pulse (!) 120   Ht 5' 8" (1.727 m)   Wt 97.9 kg (215 lb 12.8 oz)   SpO2 96%   BMI 32.81 kg/m²   Physical Exam  Vitals and nursing note reviewed.   Constitutional:       General: He is not in acute distress.     Appearance: He is well-developed. He is obese. He is not diaphoretic.   HENT:      Head: Normocephalic and atraumatic.      Right Ear: External ear normal.      Left Ear: External ear normal.      Nose: Nose normal. No " rhinorrhea.   Eyes:      Extraocular Movements: Extraocular movements intact.      Pupils: Pupils are equal, round, and reactive to light.   Cardiovascular:      Rate and Rhythm: Tachycardia present.      Pulses: Normal pulses.   Pulmonary:      Effort: Pulmonary effort is normal. No respiratory distress.      Breath sounds: Normal breath sounds.   Abdominal:      General: Bowel sounds are normal.      Palpations: Abdomen is soft.   Genitourinary:     Comments:  deferred  Musculoskeletal:         General: Normal range of motion.      Cervical back: Normal range of motion and neck supple.   Skin:     General: Skin is warm and dry.      Capillary Refill: Capillary refill takes less than 2 seconds.      Findings: No rash.   Neurological:      General: No focal deficit present.      Mental Status: He is alert and oriented to person, place, and time.      Cranial Nerves: No cranial nerve deficit.      Motor: No weakness.      Gait: Gait normal.   Psychiatric:         Attention and Perception: He is attentive.         Mood and Affect: Mood normal. Mood is not anxious or depressed. Affect is not labile, blunt, angry or inappropriate.         Speech: He is communicative. Speech is not rapid and pressured, delayed, slurred or tangential.         Behavior: Behavior normal. Behavior is not agitated, slowed, aggressive, withdrawn, hyperactive or combative.         Thought Content: Thought content normal. Thought content is not paranoid or delusional. Thought content does not include homicidal or suicidal ideation. Thought content does not include homicidal or suicidal plan.         Cognition and Memory: Memory is not impaired.         Judgment: Judgment normal. Judgment is not impulsive or inappropriate.       Physical Exam  Clear lungs.  Regular heart sounds.    Results  Imaging  CT scan shows prominent coronary artery calcification in the left anterior descending and right coronary artery. Incidental findings include aortic  valve calcifications, lymph node, tree and bud, micro nodules over a large area in the right lobe, bronchial wall thickening, small airway, possible infectious inflammatory bronchiolitis, and a 6 mm solid nodule in the right lower lobe.    Assessment:     1. Solitary pulmonary nodule    2. Need for vaccination    3. High coronary artery calcium score    4. Hyperlipidemia LDL goal <70    5. Bronchiolitis    6. Encounter for long-term (current) use of medications      MDM:   Moderate medical complexity.  Moderate risk.  Total time: 22 minutes.  This includes total time spent on the encounter, which includes face to face time and non-face to face time preparing to see the patient (eg, review of previous medical records, tests), Obtaining and/or reviewing separately obtained history, documenting clinical information in the electronic or other health record, independently interpreting results (not separately reported)/communicating results to the patient/family/caregiver, and/or care coordination (not separately reported).    I have Reviewed and summarized old records.  I have performed thorough medication reconciliation today and discussed risk and benefits of medications.  I have reviewed labs and discussed with patient.  All questions were answered.  I am requesting old records and will review them once they are available.  Cardiology  Visit today included increased complexity associated with the care of the episodic problem see above assessment addressed and managing the longitudinal care of the patient due to the serious and/or complex managed problem(s) see above.  I have signed for the following orders AND/OR meds.  Orders Placed This Encounter   Procedures    CT Chest Without Contrast     Standing Status:   Future     Standing Expiration Date:   10/9/2025     Order Specific Question:   May the Radiologist modify the order per protocol to meet the clinical needs of the patient?     Answer:   Yes    Lipid Panel      Standing Status:   Future     Standing Expiration Date:   1/7/2026     Order Specific Question:   Send normal result to authorizing provider's In Basket if patient is active on MyChart:     Answer:   Yes    CBC Without Differential     Standing Status:   Future     Standing Expiration Date:   12/8/2025    Comprehensive Metabolic Panel     Standing Status:   Future     Standing Expiration Date:   12/8/2025    TSH     Standing Status:   Future     Standing Expiration Date:   12/8/2025    Hemoglobin A1C     Standing Status:   Future     Standing Expiration Date:   12/8/2025     Medications Ordered This Encounter   Medications    atorvastatin (LIPITOR) 40 MG tablet     Sig: Take 1 tablet (40 mg total) by mouth once daily.     Dispense:  90 tablet     Refill:  3    influenza (Flulaval, Fluzone, Fluarix) 45 mcg/0.5 mL IM vaccine (> or = 6 mo) 0.5 mL        Follow up in about 6 months (around 4/9/2025), or if symptoms worsen or fail to improve.  Future Appointments       Date Specialty Appt Notes    4/9/2025 Radiology .          If no improvement in symptoms or symptoms worsen, advised to call/follow-up at clinic or go to ER. Patient voiced understanding and all questions/concerns were addressed.   DISCLAIMER: This note was compiled by using a speech recognition dictation system and therefore please be aware that typographical / speech recognition errors can and do occur.  Please contact me if you see any errors specifically.  Consent was obtained for TRAE recording system prior to the visit.    Theron Wick M.D.       Office: [phone]   [address] [place] [address]  FAX: [phone]

## 2024-11-21 ENCOUNTER — OFFICE VISIT (OUTPATIENT)
Dept: OPHTHALMOLOGY | Facility: CLINIC | Age: 54
End: 2024-11-21
Payer: COMMERCIAL

## 2024-11-21 DIAGNOSIS — B00.51 IRITIS OF LEFT EYE DUE TO HERPES SIMPLEX VIRUS (HSV): Primary | ICD-10-CM

## 2024-11-21 PROCEDURE — 99999 PR PBB SHADOW E&M-EST. PATIENT-LVL II: CPT | Mod: PBBFAC,,, | Performed by: OPTOMETRIST

## 2024-11-21 PROCEDURE — 1160F RVW MEDS BY RX/DR IN RCRD: CPT | Mod: CPTII,S$GLB,, | Performed by: OPTOMETRIST

## 2024-11-21 PROCEDURE — 99203 OFFICE O/P NEW LOW 30 MIN: CPT | Mod: S$GLB,,, | Performed by: OPTOMETRIST

## 2024-11-21 PROCEDURE — 1159F MED LIST DOCD IN RCRD: CPT | Mod: CPTII,S$GLB,, | Performed by: OPTOMETRIST

## 2024-11-21 PROCEDURE — 3044F HG A1C LEVEL LT 7.0%: CPT | Mod: CPTII,S$GLB,, | Performed by: OPTOMETRIST

## 2024-11-21 PROCEDURE — 4010F ACE/ARB THERAPY RXD/TAKEN: CPT | Mod: CPTII,S$GLB,, | Performed by: OPTOMETRIST

## 2024-11-21 RX ORDER — PREDNISOLONE ACETATE 10 MG/ML
SUSPENSION/ DROPS OPHTHALMIC
Qty: 5 ML | Refills: 0 | Status: SHIPPED | OUTPATIENT
Start: 2024-11-21 | End: 2024-12-05

## 2024-11-21 NOTE — PROGRESS NOTES
HPI     Eye Problem            Comments: Pt new to [facility] today   About 5 weeks ago had shingles really bad that went into his left eye   Currently experiencing, pain ( pain scale 7-8 at times), burning, itching   tearing   Denies vision changes   Has not seen his pcp for shingles his father is a ENT he follow up with   him    Was taking valtrex no longer taking the medication   Denies flashes and flaoters          Last edited by Madison Carrillo on 11/21/2024  8:58 AM.            Assessment /Plan     For exam results, see Encounter Report.    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  Dispense: 5 mL; Refill: 0    Trace cell today OS  Start Pred qid OS x 7 days, then tid OS until next visit  Normal IOP today OS  Systane prn   Will resume Valtrex if needed  Monitor 7-10 days      RTC 7-10 days for iritis follow up and IOP check or PRN with any worsening  Discussed above and all questions were answered.

## 2024-11-29 ENCOUNTER — TELEPHONE (OUTPATIENT)
Dept: CARDIOLOGY | Facility: CLINIC | Age: 54
End: 2024-11-29
Payer: COMMERCIAL

## 2024-11-29 NOTE — TELEPHONE ENCOUNTER
----- Message from Jodi sent at 11/29/2024 11:29 AM CST -----  Regarding: advise  Contact: pt  Type: Needs Medical Advice  Who Called:  patient   Symptoms (please be specific):    How long has patient had these symptoms:    Pharmacy name and phone #:    Best Call Back Number: [phone]    Additional Information: michelle de la cruz pt is calling back to be seen  are u available to assist MRN: [id number] REX CABRERA

## 2024-12-02 ENCOUNTER — OFFICE VISIT (OUTPATIENT)
Dept: OPHTHALMOLOGY | Facility: CLINIC | Age: 54
End: 2024-12-02
Payer: COMMERCIAL

## 2024-12-02 DIAGNOSIS — B00.51 IRITIS OF LEFT EYE DUE TO HERPES SIMPLEX VIRUS (HSV): Primary | ICD-10-CM

## 2024-12-02 DIAGNOSIS — B02.32 IRIDOCYCLITIS DUE TO HERPES ZOSTER: ICD-10-CM

## 2024-12-02 PROCEDURE — 3044F HG A1C LEVEL LT 7.0%: CPT | Mod: CPTII,S$GLB,, | Performed by: OPTOMETRIST

## 2024-12-02 PROCEDURE — 99999 PR PBB SHADOW E&M-EST. PATIENT-LVL III: CPT | Mod: PBBFAC,,, | Performed by: OPTOMETRIST

## 2024-12-02 PROCEDURE — 99214 OFFICE O/P EST MOD 30 MIN: CPT | Mod: S$GLB,,, | Performed by: OPTOMETRIST

## 2024-12-02 PROCEDURE — 1160F RVW MEDS BY RX/DR IN RCRD: CPT | Mod: CPTII,S$GLB,, | Performed by: OPTOMETRIST

## 2024-12-02 PROCEDURE — 1159F MED LIST DOCD IN RCRD: CPT | Mod: CPTII,S$GLB,, | Performed by: OPTOMETRIST

## 2024-12-02 PROCEDURE — 4010F ACE/ARB THERAPY RXD/TAKEN: CPT | Mod: CPTII,S$GLB,, | Performed by: OPTOMETRIST

## 2024-12-02 RX ORDER — VALACYCLOVIR HYDROCHLORIDE 1 G/1
1000 TABLET, FILM COATED ORAL 3 TIMES DAILY
Qty: 21 TABLET | Refills: 0 | Status: SHIPPED | OUTPATIENT
Start: 2024-12-02 | End: 2024-12-09

## 2024-12-02 NOTE — PROGRESS NOTES
HPI     Follow-up            Comments: RTC 7-10 days for iritis follow up and IOP check   Pt is compliant with drops and no complaints  Pt states VA is fine         Last edited by Deanne Velez on 12/2/2024 10:12 AM.            Assessment /Plan     For exam results, see Encounter Report.    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

## 2025-01-09 ENCOUNTER — OFFICE VISIT (OUTPATIENT)
Dept: CARDIOLOGY | Facility: CLINIC | Age: 55
End: 2025-01-09
Payer: COMMERCIAL

## 2025-01-09 VITALS
SYSTOLIC BLOOD PRESSURE: 143 MMHG | HEART RATE: 102 BPM | BODY MASS INDEX: 34.61 KG/M2 | HEIGHT: 68 IN | DIASTOLIC BLOOD PRESSURE: 82 MMHG | WEIGHT: 228.38 LBS

## 2025-01-09 DIAGNOSIS — R93.1 HIGH CORONARY ARTERY CALCIUM SCORE: Primary | Chronic | ICD-10-CM

## 2025-01-09 DIAGNOSIS — E78.5 HYPERLIPIDEMIA LDL GOAL <70: Chronic | ICD-10-CM

## 2025-01-09 DIAGNOSIS — I10 HYPERTENSION, ESSENTIAL: ICD-10-CM

## 2025-01-09 PROCEDURE — 99999 PR PBB SHADOW E&M-EST. PATIENT-LVL III: CPT | Mod: PBBFAC,,, | Performed by: INTERNAL MEDICINE

## 2025-01-09 PROCEDURE — 3008F BODY MASS INDEX DOCD: CPT | Mod: CPTII,S$GLB,, | Performed by: INTERNAL MEDICINE

## 2025-01-09 PROCEDURE — 1160F RVW MEDS BY RX/DR IN RCRD: CPT | Mod: CPTII,S$GLB,, | Performed by: INTERNAL MEDICINE

## 2025-01-09 PROCEDURE — 99214 OFFICE O/P EST MOD 30 MIN: CPT | Mod: S$GLB,,, | Performed by: INTERNAL MEDICINE

## 2025-01-09 PROCEDURE — 3077F SYST BP >= 140 MM HG: CPT | Mod: CPTII,S$GLB,, | Performed by: INTERNAL MEDICINE

## 2025-01-09 PROCEDURE — 1159F MED LIST DOCD IN RCRD: CPT | Mod: CPTII,S$GLB,, | Performed by: INTERNAL MEDICINE

## 2025-01-09 PROCEDURE — 3079F DIAST BP 80-89 MM HG: CPT | Mod: CPTII,S$GLB,, | Performed by: INTERNAL MEDICINE

## 2025-01-09 NOTE — PROGRESS NOTES
Subjective:    Patient ID:  Hal Duncan is a 54 y.o. male who presents for follow-up of Coronary coronary artery disease      HPI  He comes for follow up with no major problems, no chest pain, no shortness of breath.  He did have a coronary angiography due to elevated calcium score back in November last year.  Coronary angiography revealed a 50% lesion in the mid LAD that had FFR greater than 0.8.  Otherwise there was nonobstructive CAD.    He was started on a statin 2 months ago.  He has not had follow-up labs yet.  He has no complaints.    Exercising on a regular basis with no issues   Blood pressure has been okay at home.  Heart rate has been in the high 80s.    Review of Systems   Constitutional: Negative for decreased appetite, malaise/fatigue, weight gain and weight loss.   Cardiovascular:  Negative for chest pain, dyspnea on exertion, leg swelling, palpitations and syncope.   Respiratory:  Negative for cough and shortness of breath.    Gastrointestinal: Negative.    Neurological:  Negative for weakness.   All other systems reviewed and are negative.     Objective:      Physical Exam  Vitals and nursing note reviewed.   Constitutional:       Appearance: Normal appearance. He is well-developed.   HENT:      Head: Normocephalic.   Eyes:      Pupils: Pupils are equal, round, and reactive to light.   Neck:      Thyroid: No thyromegaly.      Vascular: No carotid bruit or JVD.   Cardiovascular:      Rate and Rhythm: Normal rate and regular rhythm.      Chest Wall: PMI is not displaced.      Pulses: Normal pulses and intact distal pulses.      Heart sounds: Normal heart sounds. No murmur heard.     No gallop.   Pulmonary:      Effort: Pulmonary effort is normal.      Breath sounds: Normal breath sounds.   Abdominal:      Palpations: Abdomen is soft. There is no mass.      Tenderness: There is no abdominal tenderness.   Musculoskeletal:         General: Normal range of motion.      Cervical back: Normal range of  motion and neck supple.   Skin:     General: Skin is warm.   Neurological:      Mental Status: He is alert and oriented to person, place, and time.      Sensory: No sensory deficit.      Deep Tendon Reflexes: Reflexes are normal and symmetric.         Most Recent EKG Results  Results for orders placed or performed during the hospital encounter of 10/14/24   EKG 12-lead    Collection Time: 10/14/24 11:34 AM   Result Value Ref Range    QRS Duration 94 ms    [facility] QTC Calculation 462 ms    Narrative    Test Reason : R07.9,    Vent. Rate : 118 BPM     Atrial Rate : 118 BPM     P-R Int : 154 ms          QRS Dur : 094 ms      QT Int : 330 ms       P-R-T Axes : 051 020 033 degrees     QTc Int : 462 ms    Sinus tachycardia  Possible Inferior infarct ,age undetermined  Abnormal ECG  When compared with ECG of 23-SEP-2024 08:59,  Borderline criteria for Inferior infarct are now Present  Confirmed by Papo Ledesma MD ([id number]) on 10/14/2024 6:14:58 PM    Referred By: PARKER MARAVILLA MD           Confirmed By:Papo Ledesma MD       Most Recent Echocardiogram Results  Results for orders placed during the hospital encounter of 09/24/24    Echo    Interpretation Summary    Left Ventricle: The left ventricle is normal in size. There is normal systolic function with a visually estimated ejection fraction of 60 - 65%. There is normal diastolic function.    Right Ventricle: Normal right ventricular cavity size. Systolic function is normal.    Pulmonary Artery: The estimated pulmonary artery systolic pressure is 12 mmHg.    IVC/SVC: Normal venous pressure at 3 mmHg.      Most Recent Nuclear Stress Test Results  No results found for this or any previous visit.      Most Recent Cardiac PET Stress Test Results  No results found for this or any previous visit.      Most Recent Cardiovascular Angiogram results  Results for orders placed during the hospital encounter of 10/14/24    Cardiac catheterization    Conclusion    There was  non-obstructive coronary artery disease..    The left ventricular systolic function was normal.    The left ventricular end diastolic pressure was normal.    The procedure log was documented by No documenter listed and verified by Kurt Burkett MD.    Date: 10/14/2024  Time: 1:43 PM      Other Most Recent Cardiology Results  No results found for this or any previous visit.      Labs reviewed    Assessment:       1. High coronary artery calcium score    2. Hypertension, essential    3. Hyperlipidemia LDL goal <70         Plan:     Continue:  Ace inhibitor, ASA, Calcium blocker, and Statin  Regular exercise program  Weight loss  Low cholesterol diet    Follow-up in 9 months.

## 2025-02-13 ENCOUNTER — LAB VISIT (OUTPATIENT)
Dept: LAB | Facility: HOSPITAL | Age: 55
End: 2025-02-13
Attending: NURSE PRACTITIONER
Payer: COMMERCIAL

## 2025-02-13 ENCOUNTER — OFFICE VISIT (OUTPATIENT)
Dept: FAMILY MEDICINE | Facility: CLINIC | Age: 55
End: 2025-02-13
Payer: COMMERCIAL

## 2025-02-13 VITALS
OXYGEN SATURATION: 95 % | TEMPERATURE: 98 F | SYSTOLIC BLOOD PRESSURE: 122 MMHG | HEART RATE: 101 BPM | DIASTOLIC BLOOD PRESSURE: 68 MMHG | HEIGHT: 68 IN | BODY MASS INDEX: 33.92 KG/M2 | WEIGHT: 223.81 LBS | RESPIRATION RATE: 17 BRPM

## 2025-02-13 DIAGNOSIS — E78.5 HYPERLIPIDEMIA LDL GOAL <70: Chronic | ICD-10-CM

## 2025-02-13 DIAGNOSIS — I10 HYPERTENSION, ESSENTIAL: ICD-10-CM

## 2025-02-13 DIAGNOSIS — B02.23 NEUROPATHY DUE TO HERPES ZOSTER: Primary | ICD-10-CM

## 2025-02-13 DIAGNOSIS — Z79.899 ENCOUNTER FOR LONG-TERM (CURRENT) USE OF MEDICATIONS: ICD-10-CM

## 2025-02-13 DIAGNOSIS — Z12.11 COLON CANCER SCREENING: ICD-10-CM

## 2025-02-13 PROBLEM — J21.9 BRONCHIOLITIS: Status: RESOLVED | Noted: 2024-10-09 | Resolved: 2025-02-13

## 2025-02-13 PROBLEM — R07.89 OTHER CHEST PAIN: Status: RESOLVED | Noted: 2024-08-07 | Resolved: 2025-02-13

## 2025-02-13 PROCEDURE — 80061 LIPID PANEL: CPT | Performed by: NURSE PRACTITIONER

## 2025-02-13 PROCEDURE — 1159F MED LIST DOCD IN RCRD: CPT | Mod: CPTII,S$GLB,, | Performed by: NURSE PRACTITIONER

## 2025-02-13 PROCEDURE — 3074F SYST BP LT 130 MM HG: CPT | Mod: CPTII,S$GLB,, | Performed by: NURSE PRACTITIONER

## 2025-02-13 PROCEDURE — 36415 COLL VENOUS BLD VENIPUNCTURE: CPT | Mod: PO | Performed by: NURSE PRACTITIONER

## 2025-02-13 PROCEDURE — 4010F ACE/ARB THERAPY RXD/TAKEN: CPT | Mod: CPTII,S$GLB,, | Performed by: NURSE PRACTITIONER

## 2025-02-13 PROCEDURE — 1160F RVW MEDS BY RX/DR IN RCRD: CPT | Mod: CPTII,S$GLB,, | Performed by: NURSE PRACTITIONER

## 2025-02-13 PROCEDURE — 85027 COMPLETE CBC AUTOMATED: CPT | Performed by: NURSE PRACTITIONER

## 2025-02-13 PROCEDURE — 3008F BODY MASS INDEX DOCD: CPT | Mod: CPTII,S$GLB,, | Performed by: NURSE PRACTITIONER

## 2025-02-13 PROCEDURE — G2211 COMPLEX E/M VISIT ADD ON: HCPCS | Mod: S$GLB,,, | Performed by: NURSE PRACTITIONER

## 2025-02-13 PROCEDURE — 83036 HEMOGLOBIN GLYCOSYLATED A1C: CPT | Performed by: NURSE PRACTITIONER

## 2025-02-13 PROCEDURE — 99999 PR PBB SHADOW E&M-EST. PATIENT-LVL IV: CPT | Mod: PBBFAC,,, | Performed by: NURSE PRACTITIONER

## 2025-02-13 PROCEDURE — 80053 COMPREHEN METABOLIC PANEL: CPT | Performed by: NURSE PRACTITIONER

## 2025-02-13 PROCEDURE — 84443 ASSAY THYROID STIM HORMONE: CPT | Performed by: NURSE PRACTITIONER

## 2025-02-13 PROCEDURE — 99214 OFFICE O/P EST MOD 30 MIN: CPT | Mod: S$GLB,,, | Performed by: NURSE PRACTITIONER

## 2025-02-13 PROCEDURE — 3078F DIAST BP <80 MM HG: CPT | Mod: CPTII,S$GLB,, | Performed by: NURSE PRACTITIONER

## 2025-02-13 RX ORDER — GABAPENTIN 300 MG/1
300 CAPSULE ORAL 3 TIMES DAILY
Qty: 90 CAPSULE | Refills: 11 | Status: SHIPPED | OUTPATIENT
Start: 2025-02-13 | End: 2026-02-13

## 2025-02-13 NOTE — PROGRESS NOTES
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 He had shingles in December and was treated with Valtrex x2 rounds. He has lingering left sided facial pain. He has sensation of stinging and burning to left side of face described as feeling like fire ants. The rash has completely resolved. He tried gabapentin 100 mg TID without relief.          Current Assessment & Plan     Increase gabapentin to 300 mg TID. Discussed medication risks. Follow up if worsening or no improvement.          Relevant Medications    gabapentin (NEURONTIN) 300 MG capsule       Psychiatric    Encounter for long-term (current) use of medications    Overview     Chronic long-term drug therapy for managed conditions. See medication list. Reports compliance.  No side effects reported.  Routine lab work is being monitored.  Refills addressed. Reviewed labs.     Lab Results   Component Value Date    WBC 12.15 10/14/2024    HGB 15.8 10/14/2024    HCT 43.8 10/14/2024    MCV 90 10/14/2024     10/14/2024         Chemistry        Component Value Date/Time     (L) 10/14/2024 1120    K 4.1 10/14/2024 1120     10/14/2024 1120    CO2 18 (L) 10/14/2024 1120    BUN 14 10/14/2024 1120    CREATININE 0.76 10/14/2024 1120     (H) 10/14/2024 1120        Component Value Date/Time    CALCIUM 9.6 10/14/2024 1120    ALKPHOS 76 10/14/2024 1120    AST 36 10/14/2024 1120    ALT 38 10/14/2024 1120    BILITOT 1.4 (H) 10/14/2024 1120        Lab Results   Component Value Date    TSH 2.757 08/07/2024            Current Assessment & Plan     Complete history and physical was completed today.  Complete and thorough medication reconciliation was performed.  Discussed risks and benefits of medications.  Advised patient on orders and health maintenance.  We discussed old records and old labs if available.  Will request any records not available through epic.   Continue current medications listed on your summary sheet.         Relevant Orders    TSH    Lipid Panel    Hemoglobin A1C    Comprehensive Metabolic Panel    CBC Without Differential       Cardiac/Vascular    Hyperlipidemia LDL goal <70 (Chronic)    Overview     CHRONIC. Lab analysis reviewed.   (-) CP, SOB, abdominal pain, N/V/D, constipation, jaundice, skin changes.  (-) Myalgias  Lab Results   Component Value Date    CHOL 239 (H) 08/07/2024    CHOL 242 (H) 02/17/2009     Lab Results   Component Value Date    HDL 38 (L) 08/07/2024    HDL 42 02/17/2009     Lab Results   Component Value Date    LDLCALC 154.2 08/07/2024    LDLCALC 165.4 (H) 02/17/2009     Lab Results   Component Value Date    TRIG 234 (H) 08/07/2024    TRIG 173 (H) 02/17/2009     Lab Results   Component Value Date    CHOLHDL 15.9 (L) 08/07/2024    CHOLHDL 17.4 (L) 02/17/2009     Lab Results   Component Value Date    TOTALCHOLEST 6.3 (H) 08/07/2024    TOTALCHOLEST 5.8 (H) 02/17/2009     Lab Results   Component Value Date    ALT 38 10/14/2024    AST 36 10/14/2024    ALKPHOS 76 10/14/2024    BILITOT 1.4 (H) 10/14/2024     ======================================================  The 10-year ASCVD risk score (Mayo RUBIO, et al., 2019) is: 8%    Values used to calculate the score:      Age: 54 years      Sex: Male      Is Non- : No      Diabetic: No      Tobacco smoker: No      Systolic Blood Pressure: 122 mmHg      Is BP treated: Yes      HDL Cholesterol: 38 mg/dL      Total Cholesterol: 239 mg/dL    Hyperlipidemia Medications               atorvastatin (LIPITOR) 40 MG tablet Take 1 tablet (40 mg total) by mouth once daily.                 Current Assessment & Plan     Update labs. Continue statin. Counseled on hyperlipidemia disease course, healthy diet and increased need for exercise.  Please be advised of the risk of cardiovascular disease, increase stroke and heart attack risk with uncontrolled/untreated hyperlipidemia. Patient  voiced understanding and understood the treatment plan. All questions were answered.          Hypertension, essential    Overview     CHRONIC. STABLE. BP Reviewed.  Compliant with BP medications. No SE reported.   (-) CP, SOB, palpitations, dizziness, lightheadedness, HA, arm numbness, tingling or weakness, syncope.  Creatinine   Date Value Ref Range Status   10/14/2024 0.76 0.50 - 1.40 mg/dL Final     Hypertension Medications               amLODIPine (NORVASC) 10 MG tablet Take 1 tablet (10 mg total) by mouth once daily.    lisinopriL 10 MG tablet Take 1 tablet (10 mg total) by mouth once daily.            Current Assessment & Plan     Continue current blood pressure medication regimen.Counseled on importance of hypertension disease course, I recommend ongoing Education for DASH-diet and exercise. Counseled on medication regimen importance of treating high blood pressure. Please be advised of risk of untreated blood pressure as discussed. Please advised of ER precautions were given for symptoms of hypertensive urgency and emergency.             GI    Colon cancer screening    Overview     He reports having a colonoscopy approx 4 years ago with a provider in . He cannot remember their name. He reports history of colon polyps and states he is due for colonoscopy in 1 year. He will let us know provider's name so we can request records.           Follow up if symptoms worsen or fail to improve, for Virtual Visit.  ER precautions for severe or worsening symptoms.     Ghazala Lazaro NP  _____________________________________________________________________________________________________________________________________________________    CC: shingles     HPI: Patient is a 54-year-old male who presents in clinic today as an established patient here for shingles neuropathy.    SHINGLES:  He reports a shingles outbreak that began 12 weeks ago affecting left side of his face. He has sensation of stinging and burning to  left side of face described as feeling like fire ants. He has completed two rounds of Valtrex. The rash has resolved. He tried taking Gabapentin 100mg 3 times daily with minimal relief. There is no eye pain, vision loss, hearing loss, rash, blisters, fever, chills.     MEDICATIONS:  Current medications include amlodipine, lisinopril, and atorvastatin.    HTN: The patient is currently being treated for essential hypertension. This condition is chronic and stable. The patient is tolerating their medication well with good compliance.  Denies any adverse effects of medications.  Counseling was offered regarding low sodium diet.  The patient has a reduced salt intake. Routine exercise recommended. The patient denies headache, vision changes, chest pain, palpitations, shortness of breath, or lower extremity edema.    Hyperlipidemia: This is a chronic problem. The current episode started more than 1 year ago. The problem is controlled. Recent lipid tests were reviewed and are variable. Pertinent negatives include no chest pain or shortness of breath. Current antihyperlipidemic treatment includes statins. The current treatment provides moderate improvement of lipids. There are no compliance problems.      Past Medical History:  Past Medical History:   Diagnosis Date    Cancer     slin cancer    Hypertension     Mixed hyperlipidemia      Past Surgical History:   Procedure Laterality Date    ANGIOGRAM, CORONARY, WITH LEFT HEART CATHETERIZATION  10/14/2024    Procedure: Left Heart Cath;  Surgeon: Kurt Burkett MD;  Location: [facility] CATH;  Service: Cardiology;;    FRACTIONAL FLOW RESERVE (FFR), CORONARY  10/14/2024    Procedure: (FFR) LAD;  Surgeon: Kurt Burkett MD;  Location: [facility] CATH;  Service: Cardiology;;    HERNIA REPAIR  1995    Right inguinal hernia repair    VASECTOMY  2008     Review of patient's allergies indicates:   Allergen Reactions    Fish containing products Anaphylaxis and Itching     Iodinated contrast media Hives    Iodine      Social History     Tobacco Use    Smoking status: Never     Passive exposure: Never    Smokeless tobacco: Never   Substance Use Topics    Alcohol use: Yes     Alcohol/week: 4.0 standard drinks of alcohol     Types: 3 Glasses of wine, 1 Shots of liquor per week    Drug use: Never     Family History   Problem Relation Name Age of Onset    Hyperlipidemia Mother Juvenal     Hypertension Mother Juvenal     Hyperlipidemia Father Mati     Hypertension Father Mati     Heart disease Maternal Grandmother Lindsey      Current Outpatient Medications on File Prior to Visit   Medication Sig Dispense Refill    amLODIPine (NORVASC) 10 MG tablet Take 1 tablet (10 mg total) by mouth once daily. 30 each 11    aspirin 81 MG Chew Take 81 mg by mouth once daily.      atorvastatin (LIPITOR) 40 MG tablet Take 1 tablet (40 mg total) by mouth once daily. 90 tablet 3    [DISCONTINUED] predniSONE (DELTASONE) 50 MG Tab Take 1 tablet (50 mg total) by mouth As instructed (Take one tablet 13 hours prior to procedure, 7 hours prior to procedure and 1 hour prior to procedure.). TAKE ONE TABLET OF PREDNISONE 13 HOURS BEFORE PROCEDURE, 7 HOURS BEFORE PROCEDURE, 1 HOUR BEFORE PROCEDURE. 3 tablet 0    [DISCONTINUED] valACYclovir (VALTREX) 1000 MG tablet Take 1 tablet (1,000 mg total) by mouth 3 (three) times daily. for 7 days 21 tablet 0    lisinopriL 10 MG tablet Take 1 tablet (10 mg total) by mouth once daily. (Patient not taking: Reported on 2/13/2025) 90 tablet 4    [DISCONTINUED] diphenhydrAMINE (BENADRYL) 50 MG capsule Take 1 capsule (50 mg total) by mouth As instructed for Allergies (TAKE 1 HOUR PRIOR TO PROCEDURE). TAKE ONE TABLET OF BENADRYL 1 HOUR PROCEDURE. (Patient not taking: Reported on 2/13/2025) 1 capsule 0     No current facility-administered medications on file prior to visit.     Review of Systems   Constitutional:  Negative for appetite change, chills, fatigue and fever.   HENT:  Negative for  "congestion, rhinorrhea and sore throat.    Eyes:  Negative for visual disturbance.   Respiratory:  Negative for cough and shortness of breath.    Cardiovascular:  Negative for chest pain, palpitations and leg swelling.   Gastrointestinal:  Negative for abdominal pain, diarrhea and vomiting.   Genitourinary:  Negative for difficulty urinating, dysuria and hematuria.   Musculoskeletal:  Negative for arthralgias and myalgias.   Skin:  Negative for rash and wound.   Neurological:  Negative for dizziness and headaches.        +left sided facial neuralgia 2/2 herpes neuropathy    Psychiatric/Behavioral:  Negative for behavioral problems. The patient is not nervous/anxious.      Vitals:    02/13/25 1320   BP: 122/68   Pulse: 101   Resp: 17   Temp: 98.2 °F (36.8 °C)   TempSrc: Oral   SpO2: 95%   Weight: 101.5 kg (223 lb 12.8 oz)   Height: 5' 8" (1.727 m)     Wt Readings from Last 3 Encounters:   02/13/25 101.5 kg (223 lb 12.8 oz)   01/09/25 103.6 kg (228 lb 6.3 oz)   10/14/24 97 kg (213 lb 13.5 oz)     Physical Exam  Vitals reviewed.   Constitutional:       General: He is not in acute distress.     Appearance: Normal appearance. He is not ill-appearing.   HENT:      Head: Normocephalic and atraumatic.      Comments: No rash/blistering     Right Ear: External ear normal.      Left Ear: External ear normal.      Nose: Nose normal.   Eyes:      Extraocular Movements: Extraocular movements intact.      Conjunctiva/sclera: Conjunctivae normal.   Cardiovascular:      Rate and Rhythm: Normal rate.      Heart sounds: Normal heart sounds.   Pulmonary:      Effort: Pulmonary effort is normal. No respiratory distress.      Breath sounds: Normal breath sounds.   Abdominal:      General: Abdomen is flat. There is no distension.   Musculoskeletal:         General: Normal range of motion.      Cervical back: Normal range of motion.   Skin:     General: Skin is warm and dry.      Capillary Refill: Capillary refill takes less than 2 " seconds.      Coloration: Skin is not pale.      Findings: No rash.   Neurological:      General: No focal deficit present.      Mental Status: He is alert and oriented to person, place, and time. Mental status is at baseline.   Psychiatric:         Mood and Affect: Mood normal.         Speech: Speech normal. Speech is not rapid and pressured, delayed or slurred.         Behavior: Behavior normal. Behavior is not agitated, slowed, aggressive, withdrawn, hyperactive or combative. Behavior is cooperative.         Thought Content: Thought content normal.         Judgment: Judgment normal.       Health Maintenance   Topic Date Due    Colorectal Cancer Screening  Never done    Shingles Vaccine (1 of 2) Never done    Pneumococcal Vaccines (Age 50+) (1 of 1 - PCV) Never done    COVID-19 Vaccine (4 - 2024-25 season) 10/09/2025 (Originally 9/1/2024)    Hemoglobin A1c (Diabetic Prevention Screening)  08/07/2027    Lipid Panel  08/07/2029    TETANUS VACCINE  08/07/2034    RSV Vaccine (Age 60+ and Pregnant patients) (1 - 1-dose 75+ series) 11/14/2046    Hepatitis C Screening  Completed    Influenza Vaccine  Completed    HIV Screening  Completed     This note was generated with the assistance of ambient listening technology. Verbal consent was obtained by the patient and accompanying visitor(s) for the recording of patient appointment to facilitate this note. I attest to having reviewed and edited the generated note for accuracy, though some syntax or spelling errors may persist. Please contact the author of this note for any clarification.    Visit today included increased complexity associated with the care of the episodic problem - see above- addressed and managing the longitudinal care of the patient due to the serious and/or complex managed problem(s) - see above.

## 2025-02-13 NOTE — ASSESSMENT & PLAN NOTE
Increase gabapentin to 300 mg TID. Discussed medication risks. Follow up if worsening or no improvement.

## 2025-02-13 NOTE — ASSESSMENT & PLAN NOTE
Update labs. Continue statin. Counseled on hyperlipidemia disease course, healthy diet and increased need for exercise.  Please be advised of the risk of cardiovascular disease, increase stroke and heart attack risk with uncontrolled/untreated hyperlipidemia. Patient voiced understanding and understood the treatment plan. All questions were answered.

## 2025-02-14 LAB
ALBUMIN SERPL BCP-MCNC: 4.6 G/DL (ref 3.5–5.2)
ALP SERPL-CCNC: 64 U/L (ref 40–150)
ALT SERPL W/O P-5'-P-CCNC: 38 U/L (ref 10–44)
ANION GAP SERPL CALC-SCNC: 9 MMOL/L (ref 8–16)
AST SERPL-CCNC: 39 U/L (ref 10–40)
BILIRUB SERPL-MCNC: 2 MG/DL (ref 0.1–1)
BUN SERPL-MCNC: 13 MG/DL (ref 6–20)
CALCIUM SERPL-MCNC: 9.9 MG/DL (ref 8.7–10.5)
CHLORIDE SERPL-SCNC: 103 MMOL/L (ref 95–110)
CHOLEST SERPL-MCNC: 178 MG/DL (ref 120–199)
CHOLEST/HDLC SERPL: 4.5 {RATIO} (ref 2–5)
CO2 SERPL-SCNC: 25 MMOL/L (ref 23–29)
CREAT SERPL-MCNC: 1 MG/DL (ref 0.5–1.4)
ERYTHROCYTE [DISTWIDTH] IN BLOOD BY AUTOMATED COUNT: 12.1 % (ref 11.5–14.5)
EST. GFR  (NO RACE VARIABLE): >60 ML/MIN/1.73 M^2
ESTIMATED AVG GLUCOSE: 105 MG/DL (ref 68–131)
GLUCOSE SERPL-MCNC: 92 MG/DL (ref 70–110)
HBA1C MFR BLD: 5.3 % (ref 4–5.6)
HCT VFR BLD AUTO: 48.1 % (ref 40–54)
HDLC SERPL-MCNC: 40 MG/DL (ref 40–75)
HDLC SERPL: 22.5 % (ref 20–50)
HGB BLD-MCNC: 16 G/DL (ref 14–18)
LDLC SERPL CALC-MCNC: 109 MG/DL (ref 63–159)
MCH RBC QN AUTO: 32.6 PG (ref 27–31)
MCHC RBC AUTO-ENTMCNC: 33.3 G/DL (ref 32–36)
MCV RBC AUTO: 98 FL (ref 82–98)
NONHDLC SERPL-MCNC: 138 MG/DL
PLATELET # BLD AUTO: 260 K/UL (ref 150–450)
PMV BLD AUTO: 9.9 FL (ref 9.2–12.9)
POTASSIUM SERPL-SCNC: 4.5 MMOL/L (ref 3.5–5.1)
PROT SERPL-MCNC: 7.8 G/DL (ref 6–8.4)
RBC # BLD AUTO: 4.91 M/UL (ref 4.6–6.2)
SODIUM SERPL-SCNC: 137 MMOL/L (ref 136–145)
TRIGL SERPL-MCNC: 145 MG/DL (ref 30–150)
TSH SERPL DL<=0.005 MIU/L-ACNC: 2.79 UIU/ML (ref 0.4–4)
WBC # BLD AUTO: 8.14 K/UL (ref 3.9–12.7)

## 2025-07-15 DIAGNOSIS — I10 HYPERTENSION, ESSENTIAL: ICD-10-CM

## 2025-07-15 RX ORDER — AMLODIPINE BESYLATE 10 MG/1
TABLET ORAL
Qty: 90 TABLET | Refills: 0 | Status: SHIPPED | OUTPATIENT
Start: 2025-07-15

## 2025-07-15 NOTE — TELEPHONE ENCOUNTER
No care due was identified.  [facility] Embedded Care Due Messages. Reference number: [id number].   7/15/2025 9:02:42 AM CDT

## 2025-07-16 NOTE — TELEPHONE ENCOUNTER
Refill Decision Note   Hal Dakota  is requesting a refill authorization.  Brief Assessment and Rationale for Refill:  Approve     Medication Therapy Plan:         Comments:     Note composed:10:30 PM 07/15/2025

## 2025-08-11 DIAGNOSIS — E78.5 HYPERLIPIDEMIA LDL GOAL <70: ICD-10-CM

## 2025-08-11 DIAGNOSIS — I10 HYPERTENSION, ESSENTIAL: ICD-10-CM

## 2025-08-11 RX ORDER — LISINOPRIL 10 MG/1
TABLET ORAL
Qty: 90 TABLET | Refills: 0 | Status: SHIPPED | OUTPATIENT
Start: 2025-08-11

## 2025-08-11 RX ORDER — ATORVASTATIN CALCIUM 40 MG/1
40 TABLET, FILM COATED ORAL
Qty: 90 TABLET | Refills: 0 | Status: SHIPPED | OUTPATIENT
Start: 2025-08-11

## 2025-08-12 DIAGNOSIS — I10 HYPERTENSION, ESSENTIAL: ICD-10-CM

## 2025-08-12 RX ORDER — LISINOPRIL 10 MG/1
TABLET ORAL
Qty: 90 TABLET | Refills: 4 | OUTPATIENT
Start: 2025-08-12